# Patient Record
Sex: FEMALE | Race: WHITE | NOT HISPANIC OR LATINO | Employment: FULL TIME | URBAN - NONMETROPOLITAN AREA
[De-identification: names, ages, dates, MRNs, and addresses within clinical notes are randomized per-mention and may not be internally consistent; named-entity substitution may affect disease eponyms.]

---

## 2017-02-17 ENCOUNTER — TELEPHONE (OUTPATIENT)
Dept: URGENT CARE | Facility: CLINIC | Age: 62
End: 2017-02-17

## 2017-02-17 NOTE — TELEPHONE ENCOUNTER
----- Message from Sonia Jane MD sent at 2/17/2017  3:06 PM EST -----  Urine culture grew bacteria sensitive to antibiotic which was prescribed but it sometimes will not adequately treat if infection has spread to kidneys; are symptoms improved?

## 2017-02-17 NOTE — TELEPHONE ENCOUNTER
Patient returned call and notified of abnormal urine culture and that the AB should take care of it. If continued symptoms at the end of meds, follow up for recheck. Verbalized understanding.

## 2017-03-02 ENCOUNTER — TELEPHONE (OUTPATIENT)
Dept: URGENT CARE | Facility: CLINIC | Age: 62
End: 2017-03-02